# Patient Record
(demographics unavailable — no encounter records)

---

## 2025-01-22 NOTE — REVIEW OF SYSTEMS
[Nasal Discharge] : nasal discharge [Sore Throat] : sore throat [Negative] : Heme/Lymph [Postnasal Drip] : postnasal drip [Headache] : no headache [FreeTextEntry4] : no difficulty swallowing  [FreeTextEntry7] : benefiber- now wnl  [de-identified] : sleeping well

## 2025-01-22 NOTE — HISTORY OF PRESENT ILLNESS
[FreeTextEntry8] : Jan 22 2025 10:20AM   81 year  old female      presents for acute care visit PMH: Osteopenia  Cardiology Dr. Isabel sees breast surgeon Dr Fariba Donovan  Still working as  part time. Allergies: augmentin  Meds:  Lisinopril 30mg BID/ citalopram,  C/o: 2 weeks ago-  started with dry cough, nasal congestion,  denying fever, SOB , CP, palps headache/sinus pressure or any other acute S&S  overall feeling better- but with continued nasal congestion took corcidin - liquid  following closely with cards-

## 2025-01-22 NOTE — PLAN
[FreeTextEntry1] : lungs clear on exam no fever on the mend   Supportive care measures reinforced - increase hydration- Drinking plenty of liquids: teas, soups,  Adequate Rest, proper nutrition,  can take OTC- staggering  tylenol or advil prn for fever/pain  as indicated.  can use OTC throat lozenges to soothe  Use saline nasal spray to clear up nasal congestion  Use a cool-mist humidifier or vaporizer to moisten the air to  ease congestion and coughing.  medication prescribed- Education on Side effect profile , medication admin instructions reviewed, risk vs benefit discussed - if ongoing  pulm follow up  Patient instructed to notify office with any new or worsening S&S as educated- patient agreeable with plan.  Strict ED precautions/parameters reinforced- patient verbalized agreement

## 2025-02-25 NOTE — PHYSICAL EXAM
[No Acute Distress] : no acute distress [Well Developed] : well developed [Normal Sclera/Conjunctiva] : normal sclera/conjunctiva [PERRL] : pupils equal round and reactive to light [Normal Oropharynx] : the oropharynx was normal [Normal TMs] : both tympanic membranes were normal [No Lymphadenopathy] : no lymphadenopathy [Thyroid Normal, No Nodules] : the thyroid was normal and there were no nodules present [No Edema] : there was no peripheral edema [No Extremity Clubbing/Cyanosis] : no extremity clubbing/cyanosis [Normal] : affect was normal and insight and judgment were intact [de-identified] : ttp left inferior ribs; resolving ecchymosis left knee; ROM intact b/l

## 2025-02-25 NOTE — REVIEW OF SYSTEMS
[Negative] : Respiratory [Fever] : no fever [Chills] : no chills [Chest Pain] : no chest pain [Palpitations] : no palpitations [Nausea] : no nausea [Diarrhea] : diarrhea [Vomiting] : no vomiting [Dysuria] : no dysuria [Headache] : no headache [Dizziness] : no dizziness [FreeTextEntry7] : gas, bloating at times [FreeTextEntry8] : drinking 3-4 bottles of water per day [FreeTextEntry9] : slight knee pain and left sided rib pain [de-identified] : Mood doing okay; sleeping well

## 2025-02-25 NOTE — HEALTH RISK ASSESSMENT
[Yes] : Yes [Never] : Never [# of Members in Household ___] :  household currently consist of [unfilled] member(s) [Employed] : employed [] :  [Any fall with injury in past year] : Patient reported fall with injury in the past year [de-identified] : Cardio [de-identified] : occasional glass of wine  [de-identified] : Walking [de-identified] : fish, chicken [de-identified] : Tripped 2 weeks over laundry. Left side rib pain with movement -both knees.  Did not hit head [Patient reported mammogram was normal] : Patient reported mammogram was normal [Patient reported bone density results were abnormal] : Patient reported bone density results were abnormal [MammogramDate] : 02/25 [BoneDensityDate] : 02/23 [BoneDensityComments] : osteopenia [FreeTextEntry2] :  4 hours a day [de-identified] : Eye exam 1 year ago; reading glasses  [de-identified] : Dentist-within the year

## 2025-02-25 NOTE — HISTORY OF PRESENT ILLNESS
[FreeTextEntry1] : Ms. MCLEOD presents for annual physical. [de-identified] : Just saw Cardio last week.  Did stress test in October.   Follow up in 3 months.   Did Mammogram last week-Dr. Luh Donovan-due for follow up in the summer.  Lisinopril BID per Cardio Calcium MVI Vit D Eating fish, chicken  Due for skin check   Fell over laundry at home 2 weeks ago; did not hit head; hit both knees-has been having some left side pain.  Was able to get up on own.

## 2025-02-25 NOTE — PLAN
[FreeTextEntry1] : Anxiety-doing well on Celexa. HTN-recheck CMP, lipids. Continue meds-following up with Cardio.  Check CBC. HM-Up to date mammo, follows with breast surgeon.  Needs new DEXA. Check Vit D.   Fall-check rib x-ray; can take Tylenol prn.   History of Skin Ca-due for Dermatology annual skin check.  Discussed ENT eval for hearing.   Will adjust meds based on labs.  Patient expressed understanding of plan.

## 2025-04-28 NOTE — PHYSICAL EXAM
[Awake] : awake [Alert] : alert [Left Breast Absent] : a total mastectomy [Soft] : soft [Oriented x3] : oriented to person, place, and time [Normal] : uterus [Uterine Adnexae] : were not tender and not enlarged [Atrophy] : atrophy [Acute Distress] : no acute distress [Mass] : no breast mass [Nipple Discharge] : no nipple discharge [Axillary LAD] : no axillary lymphadenopathy [Tender] : non tender

## 2025-04-28 NOTE — END OF VISIT
[TextEntry] : IElaine, am scribing for and the presence of Dr. Shu Jose the following sections HISTORY OF PRESENT ILLNESS, PAST MEDICAL/FAMILY/SOCIAL HISTORY; REVIEW OF SYSTEMS; PHYSICAL EXAM; DISPOSITION.